# Patient Record
Sex: FEMALE | Race: WHITE | NOT HISPANIC OR LATINO | URBAN - NONMETROPOLITAN AREA
[De-identification: names, ages, dates, MRNs, and addresses within clinical notes are randomized per-mention and may not be internally consistent; named-entity substitution may affect disease eponyms.]

---

## 2018-02-21 DIAGNOSIS — M25.511 RIGHT SHOULDER PAIN, UNSPECIFIED CHRONICITY: Primary | ICD-10-CM

## 2021-07-26 ENCOUNTER — OFFICE VISIT (OUTPATIENT)
Dept: SURGERY | Facility: CLINIC | Age: 51
End: 2021-07-26

## 2021-07-26 VITALS
DIASTOLIC BLOOD PRESSURE: 94 MMHG | HEIGHT: 62 IN | WEIGHT: 231.2 LBS | HEART RATE: 69 BPM | SYSTOLIC BLOOD PRESSURE: 162 MMHG | BODY MASS INDEX: 42.55 KG/M2

## 2021-07-26 DIAGNOSIS — R92.8 ABNORMAL MAMMOGRAM: Primary | ICD-10-CM

## 2021-07-26 DIAGNOSIS — R92.8 ABNORMAL ULTRASOUND OF BREAST: ICD-10-CM

## 2021-07-26 PROCEDURE — 99244 OFF/OP CNSLTJ NEW/EST MOD 40: CPT | Performed by: SURGERY

## 2021-07-26 RX ORDER — LISINOPRIL 10 MG/1
10 TABLET ORAL DAILY
COMMUNITY

## 2021-07-26 RX ORDER — TROSPIUM CHLORIDE ER 60 MG/1
CAPSULE ORAL EVERY MORNING
COMMUNITY

## 2021-07-26 RX ORDER — LEVOTHYROXINE SODIUM 0.1 MG/1
100 TABLET ORAL DAILY
COMMUNITY

## 2021-07-26 RX ORDER — ESTERIFIED ESTROGEN AND METHYLTESTOSTERONE .625; 1.25 MG/1; MG/1
1 TABLET ORAL DAILY
COMMUNITY

## 2021-07-26 NOTE — PROGRESS NOTES
"Subjective   Kaitlin Gallo is a 50 y.o. female here today for mammogram review.    History of Present Illness  Is Sabino was seen in the office today for breast evaluation.  This is a 50-year-old female who has a known history of a right breast cyst in the 1 o'clock position.  She underwent a bilateral mammogram with tomosynthesis and right breast ultrasound on 7/8/2021.  The known cyst in the 1 o'clock position was noted to have wall thickening and to be slightly larger.  Biopsy was recommended for this complicated cyst.  Patient denies palpable mass or nipple discharge.  There is no prior history of breast biopsy or cyst aspiration.  Ms. Gallo is nulliparous.  She did undergo a hysterectomy a year ago.  Patient states that after the hysterectomy she was started on the hormone patch although she states she was not having any real menopausal symptoms at that time.  Family history is negative for breast or ovarian cancer.  No Known Allergies    Current Outpatient Medications   Medication Sig Dispense Refill   • levothyroxine (SYNTHROID, LEVOTHROID) 100 MCG tablet Take 100 mcg by mouth Daily.     • lisinopril (PRINIVIL,ZESTRIL) 10 MG tablet Take 10 mg by mouth Daily.     • trospium 60 MG capsule sustained-release 24 hr capsule Take  by mouth Every Morning.       No current facility-administered medications for this visit.     Past Medical History:   Diagnosis Date   • Hypertension    • Thyroid activity decreased      Past Surgical History:   Procedure Laterality Date   • APPENDECTOMY     • HYSTERECTOMY     • TUBAL ABDOMINAL LIGATION         Pertinent Review of Systems    Objective   /94 (BP Location: Left arm)   Pulse 69   Ht 157.5 cm (62\")   Wt 105 kg (231 lb 3.2 oz)   BMI 42.29 kg/m²    Physical Exam  Well-developed well-nourished female no acute distress  Neck:  No supraclavicular adenopathy  Lungs:  Respiratory effort normal. Auscultation: Clear, without wheezes, rhonchi, rales  Heart:  Regular rate and " rhythm, without murmur, gallop, rub.  No pedal edema  Breasts: On visual inspection the breasts are symmetrical.  Examination of the right breast demonstrates no discrete mass, skin change, or axillary adenopathy.  Examination of the left breast demonstrates no discrete mass, skin change, or axillary adenopathy.       Procedures     Results/Data:  Imaging: Mammogram and ultrasound reports and images from 7/8/2021 were reviewed.  Ultrasound reports from 1/15/2021, 7/10/2020, and 7/3/2019 were reviewed.  On my review of the most recent ultrasound no definite irregularity of the cyst wall is identified.  Notes:   Lab:   Other:     Assessment/Plan   Complex cyst of right breast which has been present since 2019.  As the cyst wall is now reported to be thick and mildly irregular biopsy was recommended.  Of note is that the patient was started on a hormone patch 1 year ago post hysterectomy.  Index of suspicion is extremely low back appearance and the size increase is easily explained by the patient's initiation of hormone replacement therapy.    Plan: Follow-up in 3 months for office ultrasound  Stop hormone patch now  See discussion       Discussion/Summary: At first the patient was inclined to having a diagnostic procedure.  I explained to her that cyst aspiration was such a low yield that it is not any longer recommended.  As far as core biopsy goes, if a core biopsy was done it would disrupt the wall of the cyst making further imaging impossible to locate the cyst.  I explained to the patient that if she wanted a definite diagnosis that the entire cyst with cyst wall would need to be removed surgically to evaluate the entire wall.  As I think that the cyst has gotten larger due to the patient's initiation of HRT and but it does not clinically appear suspicious I would favor a short-term follow-up with the patient discontinuing her hormone patch.  Patient voiced understanding and wished to proceed with this plan of  management    Time spent:     Patient's Body mass index is 42.29 kg/m². indicating that she is overweight (BMI 25-29.9). Obesity-related health conditions include the following: hypertension. Obesity is newly identified. BMI is is above average; BMI management plan is completed. We discussed portion control and increasing exercise..         No future appointments.      Please note that portions of this note were completed with a voice recognition program.

## 2021-10-25 ENCOUNTER — OFFICE VISIT (OUTPATIENT)
Dept: SURGERY | Facility: CLINIC | Age: 51
End: 2021-10-25

## 2021-10-25 VITALS
WEIGHT: 235 LBS | DIASTOLIC BLOOD PRESSURE: 97 MMHG | BODY MASS INDEX: 43.24 KG/M2 | SYSTOLIC BLOOD PRESSURE: 176 MMHG | HEIGHT: 62 IN | HEART RATE: 81 BPM

## 2021-10-25 DIAGNOSIS — R92.8 ABNORMAL ULTRASOUND OF BREAST: Primary | ICD-10-CM

## 2021-10-25 PROCEDURE — 76642 ULTRASOUND BREAST LIMITED: CPT | Performed by: SURGERY

## 2021-10-25 PROCEDURE — 99213 OFFICE O/P EST LOW 20 MIN: CPT | Performed by: SURGERY

## 2021-10-25 NOTE — PROGRESS NOTES
"Subjective   Kaitlin Gallo is a 50 y.o. female here today for follow up ultra sound.    History of Present Illness  Ms. Gallo was seen in the office today for breast follow-up. This is a 50-year-old female who has a known history of a right breast cyst in the 1 o'clock position.  She underwent a bilateral mammogram with tomosynthesis and right breast ultrasound on 7/8/2021.  The known cyst in the 1 o'clock position was noted to have wall thickening and to be slightly larger.  Biopsy was recommended for this complicated cyst.    As the cyst has been present since 2019 and the patient had just started hormonal therapy the decision was made to stop the hormonal therapy and short-term follow-up.  Patient now presents for follow-up.  Patient denies any change in her breast examination, personal breast history or family breast history.  She has discontinued the hormone patch  No Known Allergies    Current Outpatient Medications   Medication Sig Dispense Refill   • estrogens, conjugated,-methyltestosterone (ESTRATEST HS) 0.625-1.25 MG per tablet Take 1 tablet by mouth Daily.     • levothyroxine (SYNTHROID, LEVOTHROID) 100 MCG tablet Take 100 mcg by mouth Daily.     • lisinopril (PRINIVIL,ZESTRIL) 10 MG tablet Take 10 mg by mouth Daily.     • trospium 60 MG capsule sustained-release 24 hr capsule Take  by mouth Every Morning.       No current facility-administered medications for this visit.     Past Medical History:   Diagnosis Date   • Hypertension    • Thyroid activity decreased      Past Surgical History:   Procedure Laterality Date   • APPENDECTOMY     • HYSTERECTOMY     • TUBAL ABDOMINAL LIGATION         Pertinent Review of Systems    Objective   Ht 157.5 cm (62\")   Wt 107 kg (235 lb)   BMI 42.98 kg/m²    Physical Exam  Well-developed well-nourished morbidly obese female no acute distress  Neck:  No supraclavicular adenopathy  Lungs:  Respiratory effort normal. Auscultation: Clear, without wheezes, rhonchi, " rales  Heart:  Regular rate and rhythm, without murmur, gallop, rub.  No pedal edema  Breasts: On visual inspection the breasts are symmetrical.  Examination of both breasts demonstrates no palpable mass, skin change, axillary adenopathy..      Procedures   Limited right breast Ultrasound    Indication: History of abnormal ultrasound right breast    Description: With use of the 12.5 MHz transducer the area of clinical concern was scanned in multiple planes.    Findings: The known cyst in the 1 o'clock position was identified on ultrasound.  On today's examination it measures 1.6 x 1.27 x 1.3 cm compared to the prior study of 7/8/2021 where the size was 1.9 x 1.1 x 1.6 cm.  There are no suspicious characteristics associated with the cyst    Impression: Interval decrease in size of known right breast cyst status post discontinuance of hormonal therapy    Recommendation:  Followup as clinically indicated    Results/Data:  Imaging:   Notes:   Lab:   Other:     Assessment/Plan   Right breast cyst which has been present since 2019.  Now decreased after discontinuance of hormonal therapy.    Plan: Resume routine breast surveillance  Follow-up with me as needed       Discussion/Summary:    Time spent:     Patient's Body mass index is 42.98 kg/m². indicating that she is overweight (BMI 25-29.9). Obesity-related health conditions include the following: none. Obesity is improving with lifestyle modifications. BMI is is above average; BMI management plan is completed. We discussed portion control and increasing exercise..         No future appointments.      Please note that portions of this note were completed with a voice recognition program.